# Patient Record
Sex: FEMALE | Race: WHITE | NOT HISPANIC OR LATINO | Employment: UNEMPLOYED | URBAN - METROPOLITAN AREA
[De-identification: names, ages, dates, MRNs, and addresses within clinical notes are randomized per-mention and may not be internally consistent; named-entity substitution may affect disease eponyms.]

---

## 2017-11-13 ENCOUNTER — MYC MEDICAL ADVICE (OUTPATIENT)
Dept: FAMILY MEDICINE | Facility: CLINIC | Age: 22
End: 2017-11-13

## 2017-11-13 NOTE — LETTER
[address] [address]  Phone: [phone]  Fax: [phone]    November 15, 2017        Grazyna M Karl  [address]  [place] [address]          To whom it may concern:    RE: Grazyna LYNSEY Barajas    This person has been under my care since birth.  She has Crohn's disease with intermittent gastrointestinal symptoms which periodically limit her ability to attend class. She does understand she is responsible for her class work. Please allow occasional absences.      Please contact me for questions or concerns.      Sincerely,        Genarokarey Camara MD

## 2017-11-14 NOTE — TELEPHONE ENCOUNTER
Do we have GI in [facility] for her to follow up on Crohns disease now with [facility] on board? If so could we help arrange a visit for her.  I have responded to her about note for illness and have asked if she needs a general note or one with specific dates.  I will need to know where to sent it if we contact her by phone with any info. Genaro Camara MD

## 2017-12-05 DIAGNOSIS — L70.0 CYSTIC ACNE: ICD-10-CM

## 2017-12-05 DIAGNOSIS — N94.6 DYSMENORRHEA: ICD-10-CM

## 2017-12-06 NOTE — TELEPHONE ENCOUNTER
Requested Prescriptions   Pending Prescriptions Disp Refills     VESTURA 3-0.02 MG per tablet [Pharmacy Med Name: VESTURA 3MG/0.02MG TABLETS 28'S] 84 tablet 0     Sig: TAKE ONE TABLET DAILY    Contraceptives Protocol Failed    12/5/2017  6:45 PM       Failed - Recent or future visit with authorizing provider's specialty    Patient had office visit in the last year or has a visit in the next 30 days with authorizing provider.  See chart review.              Passed - Patient is not a current smoker if age is 35 or older       Passed - No active pregnancy on record       Passed - No positive pregnancy test in past 12 months

## 2017-12-08 RX ORDER — DROSPIRENONE AND ETHINYL ESTRADIOL 0.02-3(28)
KIT ORAL
Qty: 84 TABLET | Refills: 0 | OUTPATIENT
Start: 2017-12-08

## 2018-09-01 ENCOUNTER — OFFICE VISIT (OUTPATIENT)
Dept: URGENT CARE | Facility: URGENT CARE | Age: 23
End: 2018-09-01
Payer: COMMERCIAL

## 2018-09-01 VITALS
WEIGHT: 175 LBS | TEMPERATURE: 98.6 F | OXYGEN SATURATION: 98 % | HEART RATE: 61 BPM | SYSTOLIC BLOOD PRESSURE: 102 MMHG | DIASTOLIC BLOOD PRESSURE: 70 MMHG | BODY MASS INDEX: 31 KG/M2

## 2018-09-01 DIAGNOSIS — J03.90 TONSILLITIS: Primary | ICD-10-CM

## 2018-09-01 DIAGNOSIS — J02.9 ACUTE PHARYNGITIS, UNSPECIFIED ETIOLOGY: ICD-10-CM

## 2018-09-01 LAB
BASOPHILS # BLD AUTO: 0 10E9/L (ref 0–0.2)
BASOPHILS NFR BLD AUTO: 0.1 %
DIFFERENTIAL METHOD BLD: ABNORMAL
EOSINOPHIL # BLD AUTO: 0.1 10E9/L (ref 0–0.7)
EOSINOPHIL NFR BLD AUTO: 0.3 %
HETEROPH AB SER QL: NEGATIVE
LYMPHOCYTES # BLD AUTO: 1.8 10E9/L (ref 0.8–5.3)
LYMPHOCYTES NFR BLD AUTO: 11.3 %
MONOCYTES # BLD AUTO: 1.3 10E9/L (ref 0–1.3)
MONOCYTES NFR BLD AUTO: 8.2 %
NEUTROPHILS # BLD AUTO: 12.7 10E9/L (ref 1.6–8.3)
NEUTROPHILS NFR BLD AUTO: 80.1 %
WBC # BLD AUTO: 15.9 10E9/L (ref 4–11)

## 2018-09-01 PROCEDURE — 86308 HETEROPHILE ANTIBODY SCREEN: CPT | Performed by: PHYSICIAN ASSISTANT

## 2018-09-01 PROCEDURE — 85004 AUTOMATED DIFF WBC COUNT: CPT | Performed by: PHYSICIAN ASSISTANT

## 2018-09-01 PROCEDURE — 36415 COLL VENOUS BLD VENIPUNCTURE: CPT | Performed by: PHYSICIAN ASSISTANT

## 2018-09-01 PROCEDURE — 99213 OFFICE O/P EST LOW 20 MIN: CPT | Performed by: PHYSICIAN ASSISTANT

## 2018-09-01 PROCEDURE — 85048 AUTOMATED LEUKOCYTE COUNT: CPT | Performed by: PHYSICIAN ASSISTANT

## 2018-09-01 RX ORDER — PREDNISONE 20 MG/1
40 TABLET ORAL DAILY
Qty: 10 TABLET | Refills: 0 | Status: SHIPPED | OUTPATIENT
Start: 2018-09-01 | End: 2018-09-06

## 2018-09-01 RX ORDER — BUPROPION HYDROCHLORIDE 150 MG/1
150 TABLET, EXTENDED RELEASE ORAL
COMMUNITY
Start: 2018-07-16

## 2018-09-01 NOTE — MR AVS SNAPSHOT
After Visit Summary   9/1/2018    Grazyna Barajas    MRN: [id number]           Patient Information     Date Of Birth          1995        Visit Information        Provider Department      9/1/2018 9:45 AM Sharon Calderón PA-C [facility]        Today's Diagnoses     Tonsillitis    -  1    Acute pharyngitis, unspecified etiology           Follow-ups after your visit        Who to contact     If you have questions or need follow up information about today's clinic visit or your schedule please contact [facility] directly at [phone].  Normal or non-critical lab and imaging results will be communicated to you by MyChart, letter or phone within 4 business days after the clinic has received the results. If you do not hear from us within 7 days, please contact the clinic through amSTATZhart or phone. If you have a critical or abnormal lab result, we will notify you by phone as soon as possible.  Submit refill requests through Notorious or call your pharmacy and they will forward the refill request to us. Please allow 3 business days for your refill to be completed.          Additional Information About Your Visit        MyChart Information     Notorious gives you secure access to your electronic health record. If you see a primary care provider, you can also send messages to your care team and make appointments. If you have questions, please call your primary care clinic.  If you do not have a primary care provider, please call [phone] and they will assist you.        Care EveryWhere ID     This is your Care EveryWhere ID. This could be used by other organizations to access your [place] medical records  [id number]        Your Vitals Were     Pulse Temperature Pulse Oximetry BMI (Body Mass Index)          61 98.6  F (37  C) (Tympanic) 98% 31 kg/m2         Blood Pressure from Last 3 Encounters:   09/01/18 102/70   06/29/16 128/75   08/12/15 114/64    Weight  from Last 3 Encounters:   09/01/18 175 lb (79.4 kg)   06/29/16 151 lb (68.5 kg) (83 %)*   08/12/15 155 lb (70.3 kg) (88 %)*     * Growth percentiles are based on [facility] 2-20 Years data.              We Performed the Following     Mononucleosis screen     WBC with Diff          Today's Medication Changes          These changes are accurate as of 9/1/18 10:58 AM.  If you have any questions, ask your nurse or doctor.               Start taking these medicines.        Dose/Directions    amoxicillin-clavulanate 875-125 MG per tablet   Commonly known as:  AUGMENTIN   Used for:  Tonsillitis   Started by:  Sharon Calderón PA-C        Dose:  1 tablet   Take 1 tablet by mouth 2 times daily   Quantity:  20 tablet   Refills:  0       predniSONE 20 MG tablet   Commonly known as:  DELTASONE   Used for:  Tonsillitis   Started by:  Sharon Calderón PA-C        Dose:  40 mg   Take 2 tablets (40 mg) by mouth daily for 5 days   Quantity:  10 tablet   Refills:  0            Where to get your medicines      These medications were sent to [place] [address] [place] [facility] [address]  [address], [facility] [address]     Phone:  [phone]     amoxicillin-clavulanate 875-125 MG per tablet    predniSONE 20 MG tablet                Primary Care Provider Office Phone # Fax #    Genaro Yefri Camara [phone] [address] [place] [address]        Equal Access to Services     [facility] AH: Hadii alvaro ku hadasho Soomaali, waaxda luqadaha, qaybta kaalmada adeegyada, waxay rose monreal . So [facility] [phone].    ATENCIÓN: Si habla español, tiene a blevins disposición servicios gratuitos de asistencia lingüística. Llame al [phone].    We comply with applicable federal civil rights laws and [place] laws. We do not discriminate on the basis of race, color, national origin, age, disability, sex, sexual orientation, or gender identity.            Thank you!      Thank you for choosing [facility]  for your care. Our goal is always to provide you with excellent care. Hearing back from our patients is one way we can continue to improve our services. Please take a few minutes to complete the written survey that you may receive in the mail after your visit with us. Thank you!             Your Updated Medication List - Protect others around you: Learn how to safely use, store and throw away your medicines at www.disposemymeds.org.          This list is accurate as of 9/1/18 10:58 AM.  Always use your most recent med list.                   Brand Name Dispense Instructions for use Diagnosis    amoxicillin-clavulanate 875-125 MG per tablet    AUGMENTIN    20 tablet    Take 1 tablet by mouth 2 times daily    Tonsillitis       Benzoyl Peroxide 4 % Lotn     1 Bottle    Use once daily as directed    Cystic acne       bismuth subsalicylate 262 MG chewable tablet    PEPTO BISMOL     Take 262 mg by mouth 4 times daily (before meals and nightly)        buPROPion 150 MG 12 hr tablet    WELLBUTRIN SR     Take 150 mg by mouth        clindamycin 1 % solution    CLEOCIN T    60 mL    Apply topically 2 times daily Profile Rx: patient will contact pharmacy when needed    Cystic acne       drospirenone-ethinyl estradiol 3-0.02 MG per tablet    VESTURA    28 tablet    Take 1 tablet by mouth daily Due for office visit-call for appt    Dysmenorrhea, Cystic acne       EXCEDRIN MIGRAINE PO      Take 1 tablet by mouth        MELATONIN PO      Take 3 mg by mouth At Bedtime        Multi-vitamin Tabs tablet      Take 1 tablet by mouth daily        naproxen sodium 550 MG tablet    ANAPROX    60 tablet    Take 1 tablet (550 mg) by mouth 2 times daily (with meals)    Dysmenorrhea       omeprazole 20 MG CR capsule    priLOSEC    30 capsule    Take 1 capsule (20 mg) by mouth daily 15=30 minutes before breakfast    Gastroesophageal reflux disease without esophagitis       predniSONE 20 MG tablet     DELTASONE    10 tablet    Take 2 tablets (40 mg) by mouth daily for 5 days    Tonsillitis       sertraline 50 MG tablet    ZOLOFT    30 tablet    Take 1 tablet (50 mg) by mouth daily    Anxiety

## 2018-09-01 NOTE — PROGRESS NOTES
SUBJECTIVE:  Grazyna Barajas is a 22 year old female with a chief complaint of sore throat for 3 weeks. Has been seen x 2 and negative strep both time.  Co workers with strep.  Sx were bad for 1 week and then seemed to improve slightly but now over the past week is getting worse.  Negative strep yesterday.  Worried about mono. Has felt feverish.  Hx of throat issues.  Pain worse on the left and feels like has an egg on side of neck . Able to open mouth fully.     Current and Associated symptoms: non  Treatment measures tried include Tylenol/Ibuprofen, Fluids and Rest.  Predisposing factors include exposure to strep.    Patient Active Problem List   Diagnosis     Allergic rhinitis     Obesity     Acquired acanthosis nigricans     Dysmenorrhea     Family history of factor V Leiden mutation     Cystic acne     Family history of Crohn's disease     Irritable bowel syndrome     Anxiety         Past Medical History:   Diagnosis Date     Dysmenorrhea      Current Outpatient Prescriptions   Medication Sig Dispense Refill     amoxicillin-clavulanate (AUGMENTIN) 875-125 MG per tablet Take 1 tablet by mouth 2 times daily 20 tablet 0     buPROPion (WELLBUTRIN SR) 150 MG 12 hr tablet Take 150 mg by mouth       drospirenone-ethinyl estradiol (VESTURA) 3-0.02 MG per tablet Take 1 tablet by mouth daily Due for office visit-call for appt 28 tablet 1     MELATONIN PO Take 3 mg by mouth At Bedtime       multivitamin, therapeutic with minerals (MULTI-VITAMIN) TABS Take 1 tablet by mouth daily       omeprazole (PRILOSEC) 20 MG capsule Take 1 capsule (20 mg) by mouth daily 15=30 minutes before breakfast 30 capsule 11     predniSONE (DELTASONE) 20 MG tablet Take 2 tablets (40 mg) by mouth daily for 5 days 10 tablet 0     sertraline (ZOLOFT) 50 MG tablet Take 1 tablet (50 mg) by mouth daily 30 tablet 11     Aspirin-Acetaminophen-Caffeine (EXCEDRIN MIGRAINE PO) Take 1 tablet by mouth       Benzoyl Peroxide 4 % LOTN Use once daily as  directed 1 Bottle 1     bismuth subsalicylate (PEPTO BISMOL) 262 MG chewable tablet Take 262 mg by mouth 4 times daily (before meals and nightly)       clindamycin (CLEOCIN T) 1 % external solution Apply topically 2 times daily Profile Rx: patient will contact pharmacy when needed 60 mL 11     naproxen sodium (ANAPROX) 550 MG tablet Take 1 tablet (550 mg) by mouth 2 times daily (with meals) 60 tablet 11     Social History   Substance Use Topics     Smoking status: Never Smoker     Smokeless tobacco: Never Used      Comment: no smokers in the household     Alcohol use No       ROS:  Review of systems negative except as stated above.    OBJECTIVE:   /70 (BP Location: Right arm, Patient Position: Chair, Cuff Size: Adult Regular)  Pulse 61  Temp 98.6  F (37  C) (Tympanic)  Wt 175 lb (79.4 kg)  SpO2 98%  BMI 31 kg/m2  GENERAL APPEARANCE: healthy, alert and no distress  EYES: EOMI,  PERRL, conjunctiva clear  HENT: TM's normal bilaterally, tonsillar hypertrophy, tonsillar erythema and tonsillar exudate worse on the left.  Uvula midline and no abscess noted.   NECK: no adenopathy and bilateral anterior cervical adenopathy  RESP: lungs clear to auscultation - no rales, rhonchi or wheezes  CV: regular rates and rhythm, normal S1 S2, no murmur noted  ABDOMEN:  soft, nontender, no HSM or masses and bowel sounds normal  SKIN: no suspicious lesions or rashes    Results for orders placed or performed in visit on 09/01/18   WBC with Diff   Result Value Ref Range    WBC 15.9 (H) 4.0 - 11.0 10e9/L    Diff Method Automated Method     % Neutrophils 80.1 %    % Lymphocytes 11.3 %    % Monocytes 8.2 %    % Eosinophils 0.3 %    % Basophils 0.1 %    Absolute Neutrophil 12.7 (H) 1.6 - 8.3 10e9/L    Absolute Lymphocytes 1.8 0.8 - 5.3 10e9/L    Absolute Monocytes 1.3 0.0 - 1.3 10e9/L    Absolute Eosinophils 0.1 0.0 - 0.7 10e9/L    Absolute Basophils 0.0 0.0 - 0.2 10e9/L   Mononucleosis screen   Result Value Ref Range     Mononucleosis Screen Negative NEG^Negative         ASSESSMENT:      Acute pharyngitis, unspecified etiology  Tonsillitis    PLAN:   Augmentin as directed and short burst of Prednisone.  No signs of abscess noted.   Symptomatic treat with gargles, lozenges, and OTC analgesic as needed. Follow-up with primary clinic if not improving.

## 2019-02-15 ENCOUNTER — HEALTH MAINTENANCE LETTER (OUTPATIENT)
Age: 24
End: 2019-02-15

## 2019-10-02 ENCOUNTER — HEALTH MAINTENANCE LETTER (OUTPATIENT)
Age: 24
End: 2019-10-02

## 2021-01-15 ENCOUNTER — HEALTH MAINTENANCE LETTER (OUTPATIENT)
Age: 26
End: 2021-01-15

## 2021-09-04 ENCOUNTER — HEALTH MAINTENANCE LETTER (OUTPATIENT)
Age: 26
End: 2021-09-04

## 2022-02-19 ENCOUNTER — HEALTH MAINTENANCE LETTER (OUTPATIENT)
Age: 27
End: 2022-02-19

## 2022-10-22 ENCOUNTER — HEALTH MAINTENANCE LETTER (OUTPATIENT)
Age: 27
End: 2022-10-22

## 2023-04-01 ENCOUNTER — HEALTH MAINTENANCE LETTER (OUTPATIENT)
Age: 28
End: 2023-04-01